# Patient Record
Sex: MALE | Race: WHITE | NOT HISPANIC OR LATINO | URBAN - METROPOLITAN AREA
[De-identification: names, ages, dates, MRNs, and addresses within clinical notes are randomized per-mention and may not be internally consistent; named-entity substitution may affect disease eponyms.]

---

## 2017-08-29 ENCOUNTER — IMPORTED ENCOUNTER (OUTPATIENT)
Dept: URBAN - METROPOLITAN AREA CLINIC 1 | Facility: CLINIC | Age: 72
End: 2017-08-29

## 2017-08-29 PROBLEM — H43.812: Noted: 2017-08-29

## 2017-08-29 PROBLEM — H25.813: Noted: 2017-08-29

## 2017-08-29 PROCEDURE — 92014 COMPRE OPH EXAM EST PT 1/>: CPT

## 2017-08-29 PROCEDURE — 92015 DETERMINE REFRACTIVE STATE: CPT

## 2017-08-29 NOTE — PATIENT DISCUSSION
1.  Cataract OU: Observe for now without intervention. The patient was advised to contact us if any change or worsening of vision2. PVD w/o Tear OS-stable RD precautions 3. H/o Laser Retinopexy OS h/o Previous Retinal Tear OS Letter to PCP Return for an appointment in 1 yr 27 with Dr. Adrienne Treadwell.

## 2018-08-30 ENCOUNTER — IMPORTED ENCOUNTER (OUTPATIENT)
Dept: URBAN - METROPOLITAN AREA CLINIC 1 | Facility: CLINIC | Age: 73
End: 2018-08-30

## 2018-08-30 PROBLEM — H04.123: Noted: 2018-08-30

## 2018-08-30 PROBLEM — H25.813: Noted: 2018-08-30

## 2018-08-30 PROBLEM — H43.813: Noted: 2018-08-30

## 2018-08-30 PROBLEM — H16.143: Noted: 2018-08-30

## 2018-08-30 PROCEDURE — 92014 COMPRE OPH EXAM EST PT 1/>: CPT

## 2018-08-30 PROCEDURE — 92015 DETERMINE REFRACTIVE STATE: CPT

## 2018-08-30 NOTE — PATIENT DISCUSSION
1.  Cataract OU: Observe for now without intervention. The patient was advised to contact us if any change or worsening of vision2. KUNAL w/ PEK OU- Recommend increase ATs Q2-3H OU routinely 3. PVD OU - RD precautions. 4.  H/o Laser Retinopexy OS h/o Previous Retinal Tear OS MRX for glasses givenReturn for an appointment in 1 year 30/glare with Dr. Madison Muñoz.

## 2019-10-10 ENCOUNTER — IMPORTED ENCOUNTER (OUTPATIENT)
Dept: URBAN - METROPOLITAN AREA CLINIC 1 | Facility: CLINIC | Age: 74
End: 2019-10-10

## 2019-10-10 PROBLEM — H43.813: Noted: 2019-10-10

## 2019-10-10 PROBLEM — H04.123: Noted: 2019-10-10

## 2019-10-10 PROBLEM — H25.813: Noted: 2019-10-10

## 2019-10-10 PROBLEM — H16.143: Noted: 2019-10-10

## 2019-10-10 PROCEDURE — 92015 DETERMINE REFRACTIVE STATE: CPT

## 2019-10-10 PROCEDURE — 92014 COMPRE OPH EXAM EST PT 1/>: CPT

## 2019-10-10 NOTE — PATIENT DISCUSSION
1.  Cataract OU:  Discussed options of Phaco/PCL vs. observation with new MRx today; Patient wishes to hold on Phaco at this time. Consider Phaco once patient symptomatic. 2.  KUNAL w/ PEK OU- Cont ATs TID OU Routinely. 3.  PVD OU - RD precautions. 4.  H/o Laser Retinopexy OS H/o Previous Retinal Tear OS Letter to PCP MRx given Return for an appointment in 1 yr 30 glare with Dr. Lissa Stephens.

## 2020-10-13 ENCOUNTER — IMPORTED ENCOUNTER (OUTPATIENT)
Dept: URBAN - METROPOLITAN AREA CLINIC 1 | Facility: CLINIC | Age: 75
End: 2020-10-13

## 2020-10-13 PROBLEM — H43.813: Noted: 2020-10-13

## 2020-10-13 PROBLEM — H25.813: Noted: 2020-10-13

## 2020-10-13 PROBLEM — H16.143: Noted: 2020-10-13

## 2020-10-13 PROBLEM — H04.123: Noted: 2020-10-13

## 2020-10-13 PROCEDURE — 92015 DETERMINE REFRACTIVE STATE: CPT

## 2020-10-13 PROCEDURE — 92014 COMPRE OPH EXAM EST PT 1/>: CPT

## 2020-10-13 NOTE — PATIENT DISCUSSION
1.  Cataract OU --  Discussed options of Phaco/PCL vs. observation with new MRx today; Patient wishes to hold on Phaco at this time. Consider Phaco once patient becomes symptomatic. 2.  KUNAL w/ PEK OU -- Cont ATs TID OU Routinely. 3.  PVD OU -- Stable. RD precautions. 4.  H/o Laser Retinopexy OS H/o Previous Retinal Tear OS Return for an appointment in 1 year 30 glare with Dr. Elyssa Ngo.

## 2021-10-19 ENCOUNTER — IMPORTED ENCOUNTER (OUTPATIENT)
Dept: URBAN - METROPOLITAN AREA CLINIC 1 | Facility: CLINIC | Age: 76
End: 2021-10-19

## 2021-10-19 PROBLEM — H16.143: Noted: 2021-10-19

## 2021-10-19 PROBLEM — H43.813: Noted: 2021-10-19

## 2021-10-19 PROBLEM — H04.123: Noted: 2021-10-19

## 2021-10-19 PROBLEM — Z96.1: Noted: 2023-01-18

## 2021-10-19 PROBLEM — H25.813: Noted: 2021-10-19

## 2021-10-19 PROCEDURE — 92015 DETERMINE REFRACTIVE STATE: CPT

## 2021-10-19 PROCEDURE — 99214 OFFICE O/P EST MOD 30 MIN: CPT

## 2022-04-02 ASSESSMENT — VISUAL ACUITY
OD_SC: 20/20-2
OS_CC: J1
OD_GLARE: 20/70
OS_SC: 20/20
OD_CC: J1
OS_GLARE: 20/60
OS_CC: J1
OD_CC: J1
OS_GLARE: 20/60
OD_CC: J1
OS_CC: J1
OS_SC: 20/25
OS_SC: 20/30
OS_GLARE: 20/70
OS_SC: 20/30
OD_SC: 20/25
OD_CC: J1
OD_GLARE: 20/60
OD_CC: J1
OS_CC: J1
OD_GLARE: 20/60
OD_SC: 20/30
OS_CC: J1
OD_SC: 20/20
OD_SC: 20/20-1
OS_GLARE: 20/70
OD_GLARE: 20/60
OS_SC: 20/25

## 2022-04-02 ASSESSMENT — TONOMETRY
OD_IOP_MMHG: 15
OD_IOP_MMHG: 15
OS_IOP_MMHG: 14
OS_IOP_MMHG: 15
OS_IOP_MMHG: 14
OD_IOP_MMHG: 14
OS_IOP_MMHG: 14
OS_IOP_MMHG: 14

## 2022-10-20 ENCOUNTER — COMPREHENSIVE EXAM (OUTPATIENT)
Dept: URBAN - METROPOLITAN AREA CLINIC 1 | Facility: CLINIC | Age: 77
End: 2022-10-20

## 2022-10-20 DIAGNOSIS — H16.143: ICD-10-CM

## 2022-10-20 DIAGNOSIS — H43.813: ICD-10-CM

## 2022-10-20 DIAGNOSIS — H25.813: ICD-10-CM

## 2022-10-20 DIAGNOSIS — H04.123: ICD-10-CM

## 2022-10-20 PROCEDURE — 99214 OFFICE O/P EST MOD 30 MIN: CPT

## 2022-10-20 PROCEDURE — 92015 DETERMINE REFRACTIVE STATE: CPT

## 2022-10-20 ASSESSMENT — TONOMETRY
OD_IOP_MMHG: 13
OS_IOP_MMHG: 13

## 2022-10-20 ASSESSMENT — VISUAL ACUITY
OS_BAT: 20/70
OD_CC: 20/25
OU_CC: J1
OD_BAT: 20/60
OS_CC: 20/30+2

## 2022-10-20 NOTE — PATIENT DISCUSSION
Visually Significant secondary to glare, discussed the risks, benefits, alternatives, and limitations of cataract surgery. The patient stated a full understanding and a desire to proceed with the procedure. The patient will need to return for pre-op appointment with cataract measurements and to have any additional questions answered, and start pre-operative eye drops as directed. Phaco OS then OD.

## 2022-12-16 ENCOUNTER — PRE-OP/H&P (OUTPATIENT)
Dept: URBAN - METROPOLITAN AREA CLINIC 1 | Facility: CLINIC | Age: 77
End: 2022-12-16

## 2022-12-16 VITALS
HEART RATE: 83 BPM | WEIGHT: 195 LBS | BODY MASS INDEX: 26.41 KG/M2 | DIASTOLIC BLOOD PRESSURE: 62 MMHG | HEIGHT: 72 IN | SYSTOLIC BLOOD PRESSURE: 115 MMHG

## 2022-12-16 PROCEDURE — 92025 CPTRIZED CORNEAL TOPOGRAPHY: CPT

## 2022-12-16 PROCEDURE — 99499 UNLISTED E&M SERVICE: CPT

## 2022-12-16 PROCEDURE — 92136 OPHTHALMIC BIOMETRY: CPT

## 2022-12-16 ASSESSMENT — VISUAL ACUITY
OS_BAT: 20/70
OS_SC: 20/100
OD_SC: 20/100
OD_BAT: 20/60

## 2022-12-16 NOTE — PATIENT DISCUSSION
Visually Significant Secondary to glare, discussed the risks, benefits, alternatives, and limitations of cataract surgery. The patient stated a full understanding and a desire to proceed with the procedure. Discussed with patient if post-op drops are more than $120 for all three combined when filling at their Pharmacy, please call our office to request generic substitutions. Patient came into pre-op appointment with wife and lifestyle questioner completed. Patient understands he will need specs post-op to achieve best corrected vision.

## 2023-01-10 ENCOUNTER — PRE-OP/H&P (OUTPATIENT)
Dept: URBAN - METROPOLITAN AREA CLINIC 1 | Facility: CLINIC | Age: 78
End: 2023-01-10

## 2023-01-10 VITALS
SYSTOLIC BLOOD PRESSURE: 115 MMHG | BODY MASS INDEX: 26.41 KG/M2 | HEIGHT: 72 IN | DIASTOLIC BLOOD PRESSURE: 62 MMHG | HEART RATE: 83 BPM | WEIGHT: 195 LBS

## 2023-01-10 DIAGNOSIS — H25.813: ICD-10-CM

## 2023-01-10 PROCEDURE — 92012 INTRM OPH EXAM EST PATIENT: CPT

## 2023-01-10 ASSESSMENT — VISUAL ACUITY
OD_CC: 20/25
OD_BAT: 20/70
OS_BAT: 20/70
OD_CC: J1
OD_SC: 20/150
OS_SC: 20/150
OS_CC: 20/30
OS_CC: J2

## 2023-01-18 ENCOUNTER — SURGERY/PROCEDURE (OUTPATIENT)
Dept: URBAN - METROPOLITAN AREA SURGERY 1 | Facility: SURGERY | Age: 78
End: 2023-01-18

## 2023-01-18 DIAGNOSIS — H25.812: ICD-10-CM

## 2023-01-18 PROCEDURE — 66984 XCAPSL CTRC RMVL W/O ECP: CPT

## 2023-01-19 ENCOUNTER — POST-OP (OUTPATIENT)
Dept: URBAN - METROPOLITAN AREA CLINIC 1 | Facility: CLINIC | Age: 78
End: 2023-01-19

## 2023-01-19 DIAGNOSIS — Z96.1: ICD-10-CM

## 2023-01-19 PROCEDURE — 99024 POSTOP FOLLOW-UP VISIT: CPT

## 2023-01-19 RX ORDER — PREDNISOLONE ACETATE 10 MG/ML: 1 SUSPENSION/ DROPS OPHTHALMIC TWICE A DAY

## 2023-01-19 ASSESSMENT — TONOMETRY: OS_IOP_MMHG: 20

## 2023-01-19 ASSESSMENT — VISUAL ACUITY
OS_SC: J3
OS_SC: 20/100

## 2023-01-19 NOTE — PATIENT DISCUSSION
Use Ketorolac BID, Pred BID and Ocuflox BID through completion of PO gtt chart regimen/ Per our instructions given to patient.

## 2023-01-23 ENCOUNTER — POST OP/EVAL OF SECOND EYE (OUTPATIENT)
Dept: URBAN - METROPOLITAN AREA CLINIC 1 | Facility: CLINIC | Age: 78
End: 2023-01-23

## 2023-01-23 VITALS
WEIGHT: 190 LBS | HEART RATE: 83 BPM | DIASTOLIC BLOOD PRESSURE: 62 MMHG | HEIGHT: 72 IN | SYSTOLIC BLOOD PRESSURE: 115 MMHG | BODY MASS INDEX: 25.73 KG/M2

## 2023-01-23 DIAGNOSIS — H25.811: ICD-10-CM

## 2023-01-23 DIAGNOSIS — Z96.1: ICD-10-CM

## 2023-01-23 PROCEDURE — 99024 POSTOP FOLLOW-UP VISIT: CPT

## 2023-01-23 PROCEDURE — 92025 CPTRIZED CORNEAL TOPOGRAPHY: CPT

## 2023-01-23 PROCEDURE — 92136 OPHTHALMIC BIOMETRY: CPT

## 2023-01-23 ASSESSMENT — KERATOMETRY
OD_K1POWER_DIOPTERS: 43.75
OS_K2POWER_DIOPTERS: 46.50
OS_K1POWER_DIOPTERS: 45.25
OD_K2POWER_DIOPTERS: 45.00
OD_AXISANGLE_DEGREES: 076
OS_AXISANGLE2_DEGREES: 49
OS_AXISANGLE_DEGREES: 139
OD_AXISANGLE2_DEGREES: 166

## 2023-01-23 ASSESSMENT — TONOMETRY
OS_IOP_MMHG: 17
OD_IOP_MMHG: 15

## 2023-01-23 ASSESSMENT — VISUAL ACUITY
OD_BAT: 20/70
OD_SC: 20/150
OS_SC: 20/60-2

## 2023-01-23 NOTE — PATIENT DISCUSSION
Visually Significant Secondary to glare, discussed the risks, benefits, alternatives, and limitations of cataract surgery. The patient stated a full understanding and a desire to proceed with the procedure. Discussed with patient if post-op drops are more than $120 for all three combined when filling at their Pharmacy, please call our office to request generic substitutions. Phaco PCL OD (TORIC w/ LenSx).

## 2023-11-20 ENCOUNTER — COMPREHENSIVE EXAM (OUTPATIENT)
Dept: URBAN - METROPOLITAN AREA CLINIC 1 | Facility: CLINIC | Age: 78
End: 2023-11-20

## 2023-11-20 DIAGNOSIS — H16.143: ICD-10-CM

## 2023-11-20 DIAGNOSIS — H01.02A: ICD-10-CM

## 2023-11-20 DIAGNOSIS — H02.88A: ICD-10-CM

## 2023-11-20 DIAGNOSIS — H02.88B: ICD-10-CM

## 2023-11-20 DIAGNOSIS — H43.813: ICD-10-CM

## 2023-11-20 DIAGNOSIS — H01.02B: ICD-10-CM

## 2023-11-20 DIAGNOSIS — H26.493: ICD-10-CM

## 2023-11-20 DIAGNOSIS — H04.123: ICD-10-CM

## 2023-11-20 PROCEDURE — 99214 OFFICE O/P EST MOD 30 MIN: CPT

## 2023-11-20 ASSESSMENT — KERATOMETRY
OD_K2POWER_DIOPTERS: 45.00
OS_K1POWER_DIOPTERS: 45.25
OD_AXISANGLE2_DEGREES: 166
OS_AXISANGLE_DEGREES: 139
OS_AXISANGLE2_DEGREES: 49
OD_AXISANGLE_DEGREES: 076
OD_K1POWER_DIOPTERS: 43.75
OS_K2POWER_DIOPTERS: 46.50

## 2023-11-20 ASSESSMENT — TONOMETRY
OS_IOP_MMHG: 14
OD_IOP_MMHG: 14

## 2023-11-20 ASSESSMENT — VISUAL ACUITY
OS_CC: 20/20
OD_CC: J1+
OS_CC: J1+
OD_CC: 20/20

## 2024-11-19 ENCOUNTER — COMPREHENSIVE EXAM (OUTPATIENT)
Dept: URBAN - METROPOLITAN AREA CLINIC 1 | Facility: CLINIC | Age: 79
End: 2024-11-19

## 2024-11-19 DIAGNOSIS — H26.493: ICD-10-CM

## 2024-11-19 DIAGNOSIS — H01.02A: ICD-10-CM

## 2024-11-19 DIAGNOSIS — H04.123: ICD-10-CM

## 2024-11-19 DIAGNOSIS — H16.143: ICD-10-CM

## 2024-11-19 DIAGNOSIS — H01.02B: ICD-10-CM

## 2024-11-19 PROCEDURE — 99214 OFFICE O/P EST MOD 30 MIN: CPT
